# Patient Record
Sex: FEMALE | Race: WHITE | Employment: OTHER | URBAN - METROPOLITAN AREA
[De-identification: names, ages, dates, MRNs, and addresses within clinical notes are randomized per-mention and may not be internally consistent; named-entity substitution may affect disease eponyms.]

---

## 2023-08-21 ENCOUNTER — HOSPITAL ENCOUNTER (OUTPATIENT)
Dept: DATA CONVERSION | Facility: HOSPITAL | Age: 73
End: 2023-08-21

## 2023-08-21 DIAGNOSIS — Z12.31 ENCOUNTER FOR SCREENING MAMMOGRAM FOR MALIGNANT NEOPLASM OF BREAST: ICD-10-CM

## 2023-09-11 PROBLEM — H52.7 REFRACTIVE ERROR: Status: ACTIVE | Noted: 2023-09-11

## 2023-09-11 PROBLEM — H57.03 MIOSIS: Status: ACTIVE | Noted: 2023-09-11

## 2023-09-11 PROBLEM — H02.834 DERMATOCHALASIS OF LEFT UPPER EYELID: Status: ACTIVE | Noted: 2023-09-11

## 2023-09-11 PROBLEM — H25.10 NUCLEAR SENILE CATARACT: Status: ACTIVE | Noted: 2023-09-11

## 2023-09-11 PROBLEM — H02.831 DERMATOCHALASIS OF RIGHT UPPER EYELID: Status: ACTIVE | Noted: 2023-09-11

## 2023-09-11 RX ORDER — CALCIUM CARBONATE 600 MG
600 TABLET ORAL
COMMUNITY

## 2023-09-11 RX ORDER — FEXOFENADINE HCL 60 MG
60 TABLET ORAL 2 TIMES DAILY PRN
COMMUNITY

## 2023-12-29 ENCOUNTER — HOSPITAL ENCOUNTER (OUTPATIENT)
Dept: RADIOLOGY | Facility: HOSPITAL | Age: 73
Discharge: HOME | End: 2023-12-29
Payer: MEDICARE

## 2023-12-29 ENCOUNTER — APPOINTMENT (OUTPATIENT)
Dept: RADIOLOGY | Facility: CLINIC | Age: 73
End: 2023-12-29

## 2023-12-29 VITALS — HEIGHT: 71 IN | BODY MASS INDEX: 24.36 KG/M2 | WEIGHT: 174 LBS

## 2023-12-29 DIAGNOSIS — Z12.31 ENCOUNTER FOR SCREENING MAMMOGRAM FOR MALIGNANT NEOPLASM OF BREAST: ICD-10-CM

## 2023-12-29 PROCEDURE — 77063 BREAST TOMOSYNTHESIS BI: CPT

## 2024-06-04 ENCOUNTER — CLINICAL SUPPORT (OUTPATIENT)
Dept: OPHTHALMOLOGY | Facility: CLINIC | Age: 74
End: 2024-06-04
Payer: MEDICARE

## 2024-06-04 ENCOUNTER — OFFICE VISIT (OUTPATIENT)
Dept: OPHTHALMOLOGY | Facility: CLINIC | Age: 74
End: 2024-06-04
Payer: MEDICARE

## 2024-06-04 ENCOUNTER — APPOINTMENT (OUTPATIENT)
Dept: OPHTHALMOLOGY | Facility: CLINIC | Age: 74
End: 2024-06-04
Payer: MEDICARE

## 2024-06-04 DIAGNOSIS — H25.813 COMBINED FORMS OF AGE-RELATED CATARACT OF BOTH EYES: Primary | ICD-10-CM

## 2024-06-04 DIAGNOSIS — H02.831 DERMATOCHALASIS OF BOTH UPPER EYELIDS: ICD-10-CM

## 2024-06-04 DIAGNOSIS — H02.834 DERMATOCHALASIS OF BOTH UPPER EYELIDS: ICD-10-CM

## 2024-06-04 DIAGNOSIS — H57.03 MIOSIS: ICD-10-CM

## 2024-06-04 DIAGNOSIS — H52.7 REFRACTIVE ERROR: ICD-10-CM

## 2024-06-04 PROCEDURE — 92015 DETERMINE REFRACTIVE STATE: CPT | Performed by: OPHTHALMOLOGY

## 2024-06-04 PROCEDURE — 99214 OFFICE O/P EST MOD 30 MIN: CPT | Performed by: OPHTHALMOLOGY

## 2024-06-04 RX ORDER — LOSARTAN POTASSIUM 100 MG/1
1 TABLET ORAL
COMMUNITY
Start: 2024-05-20

## 2024-06-04 RX ORDER — ATORVASTATIN CALCIUM 10 MG/1
10 TABLET, FILM COATED ORAL DAILY
COMMUNITY
Start: 2024-05-28

## 2024-06-04 ASSESSMENT — KERATOMETRY
OS_AXISANGLE_DEGREES: 100
OS_K2POWER_DIOPTERS: 42.75
OD_K1POWER_DIOPTERS: 42.75
OS_K1POWER_DIOPTERS: 42.50
OD_K2POWER_DIOPTERS: 43.75
OD_AXISANGLE_DEGREES: 100
OD_AXISANGLE2_DEGREES: 10
METHOD_AUTO_MANUAL: AUTOMATED
OS_AXISANGLE2_DEGREES: 10

## 2024-06-04 ASSESSMENT — TONOMETRY
OS_IOP_MMHG: 10
IOP_METHOD: GOLDMANN APPLANATION
OD_IOP_MMHG: 10

## 2024-06-04 ASSESSMENT — REFRACTION_MANIFEST
OD_CYLINDER: -0.50
OD_SPHERE: +2.25
METHOD_AUTOREFRACTION: 1
OS_CYLINDER: -0.50
OD_AXIS: 010
OS_SPHERE: +2.50
OS_AXIS: 075

## 2024-06-04 ASSESSMENT — ENCOUNTER SYMPTOMS
RESPIRATORY NEGATIVE: 0
OCCASIONAL FEELINGS OF UNSTEADINESS: 0
LOSS OF SENSATION IN FEET: 0
ENDOCRINE NEGATIVE: 0
ALLERGIC/IMMUNOLOGIC NEGATIVE: 0
NEUROLOGICAL NEGATIVE: 0
PSYCHIATRIC NEGATIVE: 0
EYES NEGATIVE: 0
DEPRESSION: 0
GASTROINTESTINAL NEGATIVE: 0
MUSCULOSKELETAL NEGATIVE: 0
CARDIOVASCULAR NEGATIVE: 0
CONSTITUTIONAL NEGATIVE: 0
HEMATOLOGIC/LYMPHATIC NEGATIVE: 0

## 2024-06-04 ASSESSMENT — CUP TO DISC RATIO
OS_RATIO: 0.3
OD_RATIO: 0.3

## 2024-06-04 ASSESSMENT — REFRACTION_WEARINGRX
OD_ADD: +2.75
OS_SPHERE: +1.75
SPECS_TYPE: PAL
OD_SPHERE: +1.75
OS_ADD: +2.75

## 2024-06-04 ASSESSMENT — EXTERNAL EXAM - RIGHT EYE: OD_EXAM: BROW PTOSIS

## 2024-06-04 ASSESSMENT — VISUAL ACUITY
OS_CC: 20/20
CORRECTION_TYPE: GLASSES
OD_CC+: -1
OD_CC: 20/20
OS_CC+: -2
METHOD: SNELLEN - SINGLE

## 2024-06-04 ASSESSMENT — SLIT LAMP EXAM - LIDS
COMMENTS: 1+ BLEPHARITIS, 1+ DERMATOCHALASIS - UPPER LID
COMMENTS: 1+ BLEPHARITIS, 1+ DERMATOCHALASIS - UPPER LID

## 2024-06-04 ASSESSMENT — PATIENT HEALTH QUESTIONNAIRE - PHQ9
2. FEELING DOWN, DEPRESSED OR HOPELESS: NOT AT ALL
SUM OF ALL RESPONSES TO PHQ9 QUESTIONS 1 AND 2: 0
1. LITTLE INTEREST OR PLEASURE IN DOING THINGS: NOT AT ALL

## 2024-06-04 ASSESSMENT — PAIN SCALES - GENERAL: PAINLEVEL: 0-NO PAIN

## 2024-06-04 ASSESSMENT — EXTERNAL EXAM - LEFT EYE: OS_EXAM: BROW PTOSIS

## 2024-06-04 NOTE — PROGRESS NOTES
Subjective   Patient ID: [id number] Lobo is a 74 y.o. female.    Chief Complaint    Annual Exam       HPI    No visual acuity (VA) complaints    Complete exam.  No new changes in health history or meds.  Vision is good and stable.  No new problems or complaints.      Last edited by Delonte Tatum MD on 6/4/2024 10:02 AM.        No current outpatient medications on file. (Ophthalmology pharm classes)       Current Outpatient Medications (Other)   Medication Sig Dispense Refill    atorvastatin (Lipitor) 10 mg tablet Take 1 tablet (10 mg) by mouth once daily.      fexofenadine (Allegra Allergy) 60 mg tablet Take 1 tablet (60 mg) by mouth 2 times a day as needed.      losartan (Cozaar) 100 mg tablet Take 1 tablet (100 mg) by mouth once daily in the morning. Take before meals.      calcium carbonate 600 mg calcium (1,500 mg) tablet Take 1 tablet (600 mg) by mouth 2 times a day with meals.      calcium citrate/vitamin D3 (CALCITRATE-VITAMIN D ORAL) Take 1 tablet by mouth once daily.      MAGNESIUM ORAL Take 1 tablet by mouth once daily. With a meal      multivit-min/folic acid/vit K1 (MULTI FOR HER 50 PLUS ORAL) Take by mouth.         Objective   Base Eye Exam       Visual Acuity (Snellen - Single)         Right Left Both    Dist cc 20/20 -1 20/20 -2     Near cc   J1+      Correction: Glasses              Tonometry (Goldmann Applanation, 8:45 AM)         Right Left    Pressure 10 10              Pupils         Dark Shape React APD    Right 3 Round Minimal None    Left 3 Round Minimal None              Extraocular Movement         Right Left     Full Full              Dilation       Both eyes: 1% Tropic 2.5% Phen @ 8:45 AM                  Additional Tests       Keratometry (Automated)         K1 Axis K2 Axis    Right 42.75 10 43.75 100    Left 42.50 10 42.75 100                  Slit Lamp and Fundus Exam       External Exam         Right Left    External Brow ptosis Brow ptosis              Slit Lamp Exam         Right  Left    Lids/Lashes 1+ Blepharitis, 1+ Dermatochalasis - upper lid 1+ Blepharitis, 1+ Dermatochalasis - upper lid    Conjunctiva/Sclera normal bulbar and palepbral conjunctiva normal bulbar and palepbral conjunctiva    Cornea normal epi/stroma/endo and tear film normal epi/stroma/endo and tear film    Anterior Chamber normal anterior chamber, deep and quiet normal anterior chamber, deep and quiet    Iris pupil miotic pupil miotic    Lens 1+ Nuclear sclerosis, 1+ Cortical cataract 1+ Nuclear sclerosis, 1+ Cortical cataract    Anterior Vitreous vitreous syneresis vitreous syneresis              Fundus Exam         Right Left    Disc Normal Normal    C/D Ratio 0.3 0.3    Macula normal macula normal macula    Vessels normal vessels normal vessels    Periphery normal retinal periphery normal retinal periphery                  Refraction       Wearing Rx         Sphere Add    Right +1.75 +2.75    Left +1.75 +2.75      Type: PAL              Manifest Refraction (Auto)         Sphere Cylinder Axis    Right +2.25 -0.50 010    Left +2.50 -0.50 075      Pupillary Distance: 72              Final Rx         Sphere Dist VA Add Near VA    Right +1.75 20/20 +2.75 J1+    Left +1.75 20/20 +2.75 J1+      Type: PAL    Expiration Date: 6/4/2026    Pupillary Distance: 72                    Assessment/Plan   Problem List Items Addressed This Visit          Eye/Vision problems    Dermatochalasis of both upper eyelids    Miosis    Combined forms of age-related cataract of both eyes - Primary     F/u one year full.  No change rx.           Refractive error

## 2025-01-02 ENCOUNTER — HOSPITAL ENCOUNTER (OUTPATIENT)
Dept: RADIOLOGY | Facility: HOSPITAL | Age: 75
Discharge: HOME | End: 2025-01-02
Payer: MEDICARE

## 2025-01-02 DIAGNOSIS — Z12.31 ENCOUNTER FOR SCREENING MAMMOGRAM FOR MALIGNANT NEOPLASM OF BREAST: ICD-10-CM

## 2025-01-02 PROCEDURE — 77063 BREAST TOMOSYNTHESIS BI: CPT

## 2025-06-09 ENCOUNTER — APPOINTMENT (OUTPATIENT)
Dept: OPHTHALMOLOGY | Facility: CLINIC | Age: 75
End: 2025-06-09
Payer: MEDICARE

## 2025-06-12 ENCOUNTER — APPOINTMENT (OUTPATIENT)
Dept: OPHTHALMOLOGY | Facility: CLINIC | Age: 75
End: 2025-06-12
Payer: MEDICARE

## 2025-06-12 DIAGNOSIS — H25.813 COMBINED FORMS OF AGE-RELATED CATARACT OF BOTH EYES: Primary | ICD-10-CM

## 2025-06-12 DIAGNOSIS — H52.7 REFRACTIVE ERROR: ICD-10-CM

## 2025-06-12 PROCEDURE — 99213 OFFICE O/P EST LOW 20 MIN: CPT | Performed by: OPHTHALMOLOGY

## 2025-06-12 PROCEDURE — 92015 DETERMINE REFRACTIVE STATE: CPT | Performed by: OPHTHALMOLOGY

## 2025-06-12 ASSESSMENT — REFRACTION_MANIFEST
OS_ADD: +2.75
OD_SPHERE: +2.50
OS_SPHERE: +2.25
OS_SPHERE: +1.75
METHOD_AUTOREFRACTION: 1
OS_CYLINDER: -0.50
OD_ADD: +2.75
OS_AXIS: 040
OD_CYLINDER: -0.50
OD_SPHERE: +2.00
OD_AXIS: 170

## 2025-06-12 ASSESSMENT — REFRACTION_WEARINGRX
OS_SPHERE: +1.75
OD_ADD: +2.75
OS_ADD: +2.75
OD_SPHERE: +1.75

## 2025-06-12 ASSESSMENT — VISUAL ACUITY
OS_CC: 20/20
OD_CC: 20/20
OS_CC+: -1
METHOD: SNELLEN - SINGLE
CORRECTION_TYPE: GLASSES

## 2025-06-12 ASSESSMENT — TONOMETRY
OS_IOP_MMHG: 10
OD_IOP_MMHG: 11
IOP_METHOD: GOLDMANN APPLANATION

## 2025-06-12 ASSESSMENT — PATIENT HEALTH QUESTIONNAIRE - PHQ9
1. LITTLE INTEREST OR PLEASURE IN DOING THINGS: NOT AT ALL
SUM OF ALL RESPONSES TO PHQ9 QUESTIONS 1 AND 2: 0
2. FEELING DOWN, DEPRESSED OR HOPELESS: NOT AT ALL

## 2025-06-12 ASSESSMENT — ENCOUNTER SYMPTOMS
RESPIRATORY NEGATIVE: 0
NEUROLOGICAL NEGATIVE: 0
EYES NEGATIVE: 0
CONSTITUTIONAL NEGATIVE: 0
HEMATOLOGIC/LYMPHATIC NEGATIVE: 0
ALLERGIC/IMMUNOLOGIC NEGATIVE: 0
MUSCULOSKELETAL NEGATIVE: 0
ENDOCRINE NEGATIVE: 0
PSYCHIATRIC NEGATIVE: 0
GASTROINTESTINAL NEGATIVE: 0
CARDIOVASCULAR NEGATIVE: 0

## 2025-06-12 ASSESSMENT — KERATOMETRY
OS_K2POWER_DIOPTERS: 43.25
OD_AXISANGLE_DEGREES: 90
OS_AXISANGLE_DEGREES: 85
METHOD_AUTO_MANUAL: AUTOMATED
OS_AXISANGLE2_DEGREES: 175
OD_AXISANGLE2_DEGREES: 180
OD_K2POWER_DIOPTERS: 43.00
OS_K1POWER_DIOPTERS: 42.50
OD_K1POWER_DIOPTERS: 42.50

## 2025-06-12 ASSESSMENT — SLIT LAMP EXAM - LIDS
COMMENTS: 1+ DERMATOCHALASIS - UPPER LID
COMMENTS: 1+ DERMATOCHALASIS - UPPER LID

## 2025-06-12 ASSESSMENT — EXTERNAL EXAM - LEFT EYE: OS_EXAM: NORMAL

## 2025-06-12 ASSESSMENT — CUP TO DISC RATIO
OD_RATIO: 0.3
OS_RATIO: 0.3

## 2025-06-12 ASSESSMENT — EXTERNAL EXAM - RIGHT EYE: OD_EXAM: NORMAL

## 2025-06-12 ASSESSMENT — PAIN SCALES - GENERAL: PAINLEVEL_OUTOF10: 0-NO PAIN

## 2025-06-12 NOTE — PROGRESS NOTES
Assessment/Plan   Problem List Items Addressed This Visit       Combined forms of age-related cataract of both eyes - Primary    Non significant cataract noted on exam. Discussed the natural course of cataract and may require surgery at some point in the future. Will plan to continue to monitor with serial exam.            Refractive error    New Rx for glasses/SCL given per patient request. Patient's signature obtained to acknowledge and confirm that a paper copy of glasses/SCL Rx was given to patient in compliance with [facility] Eyeglass Rule. Electronic copy of Rx will also be available via [employer]/EPIC.               Provided reassurance regarding above diagnoses and care received in the office visit today. Discussed outcomes and options along with the importance of treatment compliance. Understands the importance of any follow up visits. Patient instructed to call/communicate with our office if any new issues, questions, or concerns.     Will plan to see back in 1 year full or sooner PRN

## 2025-06-12 NOTE — ASSESSMENT & PLAN NOTE
New Rx for glasses/SCL given per patient request. Patient's signature obtained to acknowledge and confirm that a paper copy of glasses/SCL Rx was given to patient in compliance with [facility] Eyeglass Rule. Electronic copy of Rx will also be available via [employer]/EPIC.

## 2025-06-12 NOTE — PATIENT INSTRUCTIONS
Thank you so much for choosing me to provide your care today!    If you were dilated your vision may remain blurry   or light sensitive for several hours.    The nature of eye and vision problems can require frequent follow up, please make every effort to adhere to any future appointments.    If you have any issues, questions, or concerns,   please do not hesitate to reach out.    If you receive a survey in regards to your care today, please mention any exceptional care my office staff and/or technicians provided.    You can reach our office at this number:    [phone]    Please consider signing up for and utilizing [employer]!  This is the best way to directly reach me or other  providers

## 2026-06-17 ENCOUNTER — APPOINTMENT (OUTPATIENT)
Dept: OPHTHALMOLOGY | Facility: CLINIC | Age: 76
End: 2026-06-17
Payer: MEDICARE